# Patient Record
Sex: FEMALE | Race: WHITE
[De-identification: names, ages, dates, MRNs, and addresses within clinical notes are randomized per-mention and may not be internally consistent; named-entity substitution may affect disease eponyms.]

---

## 2017-05-11 NOTE — PCM.OPNOTE
- General Post-Op/Procedure Note


Date of Surgery/Procedure: 05/11/17


Operative Procedure(s): c scope with biopsy


Findings: 





cecum, ascending, transverse, descending, sigmoid colon, rectum 


Pre Op Diagnosis: hx of colon polyp


Post-Op Diagnosis: polyps:  cecum, ascending, transverse, descending, sigmoid 

colon, rectum


Anesthesia Technique: MAC


Primary Surgeon: Faustino Sawyer


Anesthesia Provider: Keegan Solares


Pathology: 








cecum, ascending, transverse, descending, sigmoid colon, rectum 


Complications: None


Condition: Good


Free Text/Narrative:: 





see dictation

## 2017-05-11 NOTE — OR
DATE OF OPERATION:  05/11/2017

 

SURGEON:  Faustino Sawyer MD

 

PROCEDURE PERFORMED:  Colonoscopy with cold forceps biopsy.

 

PREOPERATIVE DIAGNOSIS:  Personal history of colon polyps.

 

POSTOPERATIVE DIAGNOSIS:  Colon polyp of the cecum, ascending colon, transverse

colon, descending colon, sigmoid, and rectum.

 

INDICATIONS FOR PROCEDURE:  This is a 71-year-old white female, who presents for

a followup colonoscopy.  Last scope was 5 years ago.  She had adenomatous polyps

removed, presents now for a followup scope.

 

DESCRIPTION OF PROCEDURE:  After an excellent IV sedation was administered,

digital rectal exam was performed.  No marked abnormality was noted.  Flexible

colonoscope was inserted and advanced to the cecum without difficulty.  The

following findings were noted:  Ascending colon in the cecum; a small polyp,

biopsied and submitted in 1 container, just distal to that another polyp was

encountered, biopsied and submitted into 1 container.  Transverse colon; a small

polyp, biopsied and submitted in 1 container.  Descending colon; a small polyp,

biopsied and submitted in 1 container.  Sigmoid; small polyp, as well as some

diverticulosis biopsied and submitted in 1 container.  Rectum; a small polyp,

biopsied and submitted in 1 container.  Colon was deflated as the scope was

removed.  The patient tolerated the procedure well, was taken to recovery room

in good condition.

 

Job#: [id number]

DD: 05/11/2017 1051

DT: 05/11/2017 1817 AS/MODL

## 2018-05-22 ENCOUNTER — HOSPITAL ENCOUNTER (OUTPATIENT)
Dept: HOSPITAL 7 - FB.SDS | Age: 73
Discharge: HOME | End: 2018-05-22
Attending: SURGERY
Payer: MEDICARE

## 2018-05-22 VITALS — DIASTOLIC BLOOD PRESSURE: 75 MMHG | SYSTOLIC BLOOD PRESSURE: 108 MMHG

## 2018-05-22 DIAGNOSIS — Z87.891: ICD-10-CM

## 2018-05-22 DIAGNOSIS — Z88.8: ICD-10-CM

## 2018-05-22 DIAGNOSIS — Z79.51: ICD-10-CM

## 2018-05-22 DIAGNOSIS — J45.909: ICD-10-CM

## 2018-05-22 DIAGNOSIS — R19.7: Primary | ICD-10-CM

## 2018-05-22 DIAGNOSIS — Z86.010: ICD-10-CM

## 2018-05-22 DIAGNOSIS — Z88.7: ICD-10-CM

## 2018-05-22 DIAGNOSIS — Z79.899: ICD-10-CM

## 2018-05-22 NOTE — OR
DATE OF OPERATION:  05/22/2018

 

SURGEON:  Faustino Sawyer MD

 

PROCEDURES PERFORMED:  Colonoscopy with random biopsies.

 

PREOPERATIVE DIAGNOSIS:  Personal history of diarrhea.

 

POSTOPERATIVE DIAGNOSIS:  Normal scope.

 

INDICATIONS FOR PROCEDURE:  This is a 72-year-old white female who is referred

with a history of diarrhea.  She was offered and accepted colonoscopy.  She also

has a history of adenomatous polyps.  She was offered and accepted same.

 

DESCRIPTION OF PROCEDURE:  After an excellent IV sedation was administered,

digital rectal exam was performed.  No marked abnormality was noted.  Flexible

colonoscope was inserted and advanced to the cecum without difficulty.  The prep

was excellent.  The following findings were noted.  Ascending colon,

unremarkable.  Random biopsies were taken.  Transverse colon, unremarkable.

Random biopsies were taken.  Descending colon, unremarkable.  Random biopsies

were taken.  Sigmoid and rectum, unremarkable.  Random biopsies were taken.  The

colon was deflated as the scope was removed.  The patient tolerated the

procedure well and was taken to Recovery in a good condition.

 

Job#: [id number]

DD: 05/22/2018 1032

DT: 05/22/2018 1158 AS/AMBER

## 2018-05-22 NOTE — PCM.OPNOTE
- General Post-Op/Procedure Note


Date of Surgery/Procedure: 05/22/18


Operative Procedure(s): c scope with bx


Findings: 





normal exam 


Pre Op Diagnosis: diarrhea


Post-Op Diagnosis: Same


Anesthesia Technique: MAC


Primary Surgeon: Faustino Sawyer


Anesthesia Provider: Jasper Galvan


Pathology: 





random colon bx 


Complications: None


Condition: Good


Free Text/Narrative:: 





see dictation

## 2018-10-01 ENCOUNTER — HOSPITAL ENCOUNTER (EMERGENCY)
Dept: HOSPITAL 7 - FB.ED | Age: 73
Discharge: OTHER INSTITUTION | End: 2018-10-01
Payer: MEDICARE

## 2018-10-01 VITALS — SYSTOLIC BLOOD PRESSURE: 64 MMHG | DIASTOLIC BLOOD PRESSURE: 37 MMHG

## 2018-10-01 DIAGNOSIS — T45.1X5A: ICD-10-CM

## 2018-10-01 DIAGNOSIS — C25.9: ICD-10-CM

## 2018-10-01 DIAGNOSIS — Z88.8: ICD-10-CM

## 2018-10-01 DIAGNOSIS — D70.1: Primary | ICD-10-CM

## 2018-10-01 DIAGNOSIS — Z88.7: ICD-10-CM

## 2018-10-01 PROCEDURE — 84132 ASSAY OF SERUM POTASSIUM: CPT

## 2018-10-01 PROCEDURE — 36415 COLL VENOUS BLD VENIPUNCTURE: CPT

## 2018-10-01 PROCEDURE — 80048 BASIC METABOLIC PNL TOTAL CA: CPT

## 2018-10-01 PROCEDURE — 87077 CULTURE AEROBIC IDENTIFY: CPT

## 2018-10-01 PROCEDURE — 83735 ASSAY OF MAGNESIUM: CPT

## 2018-10-01 PROCEDURE — 99285 EMERGENCY DEPT VISIT HI MDM: CPT

## 2018-10-01 PROCEDURE — 85025 COMPLETE CBC W/AUTO DIFF WBC: CPT

## 2018-10-01 PROCEDURE — 96361 HYDRATE IV INFUSION ADD-ON: CPT

## 2018-10-01 PROCEDURE — 87040 BLOOD CULTURE FOR BACTERIA: CPT

## 2018-10-01 PROCEDURE — 96366 THER/PROPH/DIAG IV INF ADDON: CPT

## 2018-10-01 PROCEDURE — 96375 TX/PRO/DX INJ NEW DRUG ADDON: CPT

## 2018-10-01 PROCEDURE — 96365 THER/PROPH/DIAG IV INF INIT: CPT

## 2018-10-01 PROCEDURE — 80053 COMPREHEN METABOLIC PANEL: CPT

## 2018-10-01 RX ADMIN — POTASSIUM CHLORIDE SCH MLS/HR: 200 INJECTION, SOLUTION INTRAVENOUS at 14:05

## 2018-10-01 RX ADMIN — Medication PRN ML: at 09:35

## 2018-10-01 RX ADMIN — POTASSIUM CHLORIDE ONE: 1500 TABLET, EXTENDED RELEASE ORAL at 10:34

## 2018-10-01 RX ADMIN — Medication PRN ML: at 09:59

## 2018-10-01 RX ADMIN — POTASSIUM CHLORIDE ONE MEQ: 1500 TABLET, EXTENDED RELEASE ORAL at 10:31

## 2018-10-01 RX ADMIN — POTASSIUM CHLORIDE SCH MLS/HR: 200 INJECTION, SOLUTION INTRAVENOUS at 13:32

## 2018-10-01 NOTE — EDM.PDOC
ED HPI GENERAL MEDICAL PROBLEM





- General


Chief Complaint: Syncope


Stated Complaint: DEHYDRIATED


Time Seen by Provider: 10/01/18 09:30


Source of Information: Reports: Patient, Family


History Limitations: Reports: No Limitations





- History of Present Illness


INITIAL COMMENTS - FREE TEXT/NARRATIVE: 





Colleen comes to [facility] ED by EMS following a syncopal episode this am. She was 

diagnosed with CA Pancreas about 2 weeks ago, and finsihed first course of 

chemotherapy at that time. She developed escalating weakness, vomiting and 

diarrhea last week, and did go to the Clinic daily x 3 for IV hydration. She 

seemed better over the weekend, but relapsed last evening with weakness, nausea 

and vomiting. She lost consciousness this am for "a couple of seconds" while in 

the [place], and EMS was summoned. Upon arrival, /60, , alert and 

cooperative. She received 1L NS by EMS prior to arrival. She denies headache, 

chest pain, abdominal pain or SOB. She has a peripheral IV and a veniport in R 

upper chest. 





- Related Data


 Allergies











Allergy/AdvReac Type Severity Reaction Status Date / Time


 


loratadine [From Claritin] Allergy  Swelling Verified 10/01/18 09:30


 


tetanus toxoid, adsorbed Allergy  Swelling Verified 10/01/18 09:30











Home Meds: 


 Home Meds





Albuterol [Ventolin HFA] 2 puff IH Q4H PRN 05/10/17 [History]


Budesonide/Formoterol Fumarate [Symbicort 80-4.5 Mcg Inhaler] 1 puff IH BID 05/

10/17 [History]


Calcium Citrate/Vitamin D3 [Calcium Citrate - Vit D Caplet] 1 ea PO BID 05/10/

17 [History]


Cetirizine [ZyrTEC] 10 mg PO DAILY PRN 05/10/17 [History]


Ibuprofen 200 mg PO ASDIRECTED PRN 05/10/17 [History]


Raloxifene [Evista] 60 mg PO DAILY 05/10/17 [History]


diphenhydrAMINE [Benadryl] 25 mg PO ASDIRECTED PRN 05/10/17 [History]


Carboxymethylcellulose Sodium [Refresh Plus 0.5% Ophth Soln] 1 each OP Q2HR PRN 

05/21/18 [History]


Psyllium Husk (With Sugar) [Metamucil Powder] 575 gm PO DAILY 05/21/18 [History]


Dexamethasone 4 mg PO DAILY PRN 10/01/18 [History]


Loperamide HCl [Loperamide] 4 mg PO ASDIRECTED PRN MDD 8 10/01/18 [History]


Ondansetron [Ondansetron ODT] 4 mg SL Q4H PRN 10/01/18 [History]


Prochlorperazine [Compazine] 10 mg PO Q6H 10/01/18 [History]


hydrOXYzine HCl [hydrOXYzine] 10 mg PO DAILY 10/01/18 [History]


oxyCODONE 5 mg PO Q6H PRN 10/01/18 [History]











Past Medical History


HEENT History: Reports: Cataract, Impaired Vision, Other (See Below)


Other HEENT History: EXC RIGHT UPPER LID LESION, PSEUDOPHAKIA, POSTERIOR 

VITREOUS DETACHMENT


Cardiovascular History: Reports: None


Respiratory History: Reports: Asthma


Gastrointestinal History: Reports: Colon Polyp


Genitourinary History: Reports: None


OB/GYN History: Reports: Pregnancy


Musculoskeletal History: Reports: Arthritis, Other (See Below)


Other Musculoskeletal History: PAGET'S DISEASE


Neurological History: Reports: None


Psychiatric History: Reports: None


Endocrine/Metabolic History: Reports: None


Hematologic History: Reports: None


Immunologic History: Reports: None


Oncologic (Cancer) History: Reports: Breast, Pancreatic


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


Dermatologic History: Reports: None





- Infectious Disease History


Infectious Disease History: Reports: Chicken Pox, Measles, Mumps





- Past Surgical History


Head Surgeries/Procedures: Reports: None


HEENT Surgical History: Reports: Cataract Surgery


Cardiovascular Surgical History: Reports: None


Respiratory Surgical History: Reports: None


GI Surgical History: Reports: Colonoscopy, EGD


Female  Surgical History: Reports: Other (See Below)


Other Female  Surgeries/Procedures: LEFT BREAST LUMPECTOMY


Endocrine Surgical History: Reports: None


Neurological Surgical History: Reports: None


Musculoskeletal Surgical History: Reports: None


Dermatological Surgical History: Reports: None





Social & Family History





- Family History


HEENT: 


OBGYN: Reports: Other (See Below)


Other OBGYN Family History: FAMILY HX OVARIAN CA





- Caffeine Use


Caffeine Use: Reports: Coffee, Tea





ED ROS GENERAL





- Review of Systems


Review Of Systems: See Below


Constitutional: Reports: Malaise, Weakness, Fatigue, Decreased Appetite, Weight 

Loss


HEENT: Reports: No Symptoms


Respiratory: Reports: No Symptoms


Cardiovascular: Reports: Blood Pressure Problem, Lightheadedness


GI/Abdominal: Reports: Diarrhea, Decreased Appetite, Nausea, Vomiting


: Reports: No Symptoms


Musculoskeletal: Reports: No Symptoms


Skin: Reports: No Symptoms


Neurological: Reports: Dizziness, Syncope


Psychiatric: Reports: No Symptoms


Hematologic/Lymphatic: Reports: No Symptoms


Immunologic: Reports: No Symptoms





- Physical Exam


Exam: See Below


Exam Limited By: No Limitations


General Appearance: Alert, WD/WN, No Apparent Distress, Thin


Eye Exam: Bilateral Eye: EOMI, Normal Inspection, PERRL


Ears: Normal External Exam


Nose: Normal Inspection


Throat/Mouth: Normal Inspection, Normal Gums, Normal Oropharynx, Normal Voice, 

No Airway Compromise


Head Exam: Atraumatic, Normocephalic


Neck: Normal Inspection, Supple, Non-Tender


Respiratory/Chest: No Respiratory Distress, Lungs Clear, Normal Breath Sounds, 

No Accessory Muscle Use, Chest Non-Tender


Cardiovascular: Normal Peripheral Pulses, No Edema, No Gallop, No JVD, No Murmur

, No Rub, Tachycardia


GI/Abdominal: Normal Bowel Sounds, Soft, Non-Tender, No Organomegaly, No 

Distention, No Mass


 (Female) Exam: Deferred


Rectal (Female) Exam: Deferred


Neuro Exam (Abbreviated): Alert, Oriented, CN II-XII Intact, No Motor/Sensory 

Deficits


Back Exam: Normal Inspection


Extremities: Normal Inspection


Psychiatric: Normal Mood, Flat Affect


Skin Exam: Warm, Dry, Intact, No Rash





Course





- Vital Signs


Text/Narrative:: 





Following assessment at the [facility] ED, IV fluids with D5LR was continued pending 

results of screening labs: CBC noted Hgb 11.3 gm, WBC 1,200 with 84% PMNs, plts 

117,000; Na 138, K 1.6, BUN 17, Cr 1.3, Ca 6.6; the IV was changed to NS and 

KCl 40 meq piggybacked at 20 meq/hr thru central line: repeat K 1.7 meq/l 2 

hours later; BPs 74/47 were detected and a 500 ml NS bolus was administered, 

followed by a KCL 40 meq infusion thru central line over 2 hours was repeated. 

Case was discussed with hospitalist who suggested transfer to [facility], and case was discussed with Dr Moreland who accepted patient. In 

view of persistent hypotension, BC pending, I empirically administered Zosyn 

3.375 mg IV for neutropenic fever w hypotension. 


Last Recorded V/S: 


 Last Vital Signs











Temp  38.6 C H  10/01/18 14:00


 


Pulse  108 H  10/01/18 12:00


 


Resp  20   10/01/18 14:45


 


BP  69/37 L  10/01/18 14:45


 


Pulse Ox  97   10/01/18 14:45














- Orders/Labs/Meds


Orders: 


 Active Orders 24 hr











 Category Date Time Status


 


 Central Line Assessment [RC] ASDIRECTED Care  10/01/18 09:40 Active


 


 CBC WITH AUTO DIFF [HEME] Stat Lab  10/01/18 15:00 Results


 


 COMPREHENSIVE METABOLIC PN,CMP [CHEM] Stat Lab  10/01/18 15:00 Received


 


 CULTURE BLOOD [BC] Stat Lab  10/01/18 14:45 Received


 


 Piperacillin/Tazobactam [Zosyn] 3.375 gm Med  10/01/18 15:30 Ordered





 Sodium Chloride 0.9% [Normal Saline] 50 ml   





 IV Q6H   


 


 Potassium Chloride [KCL 20 MEQ in Water 100 ML] 20 meq Med  10/01/18 14:00 

Active





 Premix Bag 1 bag   





 IV ONETIME   


 


 Sodium Chloride 0.9% [Normal Saline] 1,000 ml Med  10/01/18 10:45 Active





 IV ASDIRECTED   


 


 Sodium Chloride 0.9% [Normal Saline] 1,000 ml Med  10/01/18 13:20 Active





 IV ASDIRECTED   


 


 Sodium Chloride 0.9% [Saline Flush] Med  10/01/18 09:40 Active





 10 ml FLUSH ASDIRECTED PRN   








 Medication Orders





Sodium Chloride (Normal Saline)  1,000 mls @ 500 mls/hr IV ASDIRECTED NISSA


   Last Admin: 10/01/18 10:50  Dose: 500 mls/hr


Potassium Chloride 20 meq/ (Premix)  100 mls @ 100 mls/hr IV ONETIME NISSA


   Stop: 10/02/18 14:59


   Last Admin: 10/01/18 14:05  Dose: 100 mls/hr


Sodium Chloride (Normal Saline)  1,000 mls @ 500 mls/hr IV ASDIRECTED NISSA


   Last Admin: 10/01/18 15:09  Dose: 400 mls/hr


   Infusion: 10/01/18 15:02  Dose: 400 mls/hr


   Infusion: 10/01/18 14:05  Dose: 400 mls/hr


   Infusion: 10/01/18 13:35  Dose: 999 mls/hr


   Admin: 10/01/18 13:20  Dose: 500 mls/hr


Piperacillin Sod/Tazobactam (Sod 3.375 gm/ Sodium Chloride)  50 mls @ 100 mls/

hr IV Q6H NISSA


Sodium Chloride (Saline Flush)  10 ml FLUSH ASDIRECTED PRN


   PRN Reason: Keep Vein Open


   Last Admin: 10/01/18 09:59  Dose: 10 ml


   Admin: 10/01/18 09:35  Dose: 10 ml








Labs: 


 Laboratory Tests











  10/01/18 10/01/18 10/01/18 Range/Units





  09:55 09:55 13:10 


 


WBC  1.2 L*    (4.5-12.0)  X10-3/uL


 


RBC  3.61    (3.23-5.20)  x10(6)uL


 


Hgb  11.3 L    (11.5-15.5)  g/dL


 


Hct  33.1    (30.0-51.3)  %


 


MCV  91.8    (80-96)  fL


 


MCH  31.5    (27.7-33.6)  pg


 


MCHC  34.3    (32.2-35.4)  g/dL


 


RDW  13.0    (11.5-15.5)  %


 


Plt Count  117 L    (125-369)  X10(3)uL


 


MPV  6.9 L    (7.4-10.4)  fL


 


Add Manual Diff  Yes    


 


Neutrophils % (Manual)  84 H    (46-82)  %


 


Lymphocytes % (Manual)  16    (13-37)  %


 


Sodium   138   (135-145)  mmol/L


 


Potassium   1.6 L*  1.7 L*  (3.5-5.3)  mmol/L


 


Chloride   105   (100-110)  mmol/L


 


Carbon Dioxide   20 L   (21-32)  mmol/L


 


BUN   17   (7-18)  mg/dL


 


Creatinine   1.3 H   (0.55-1.02)  mg/dL


 


Est Cr Clr Drug Dosing   TNP   


 


Estimated GFR (MDRD)   40 L   (>60)  


 


BUN/Creatinine Ratio   13.1   (9-20)  


 


Glucose   196 H   ()  mg/dL


 


Calcium   6.6 L   (8.6-10.2)  mg/dL


 


Magnesium     (1.8-2.5)  mg/dL














  10/01/18 10/01/18 Range/Units





  13:10 15:00 


 


WBC   2.1 L  (4.5-12.0)  X10-3/uL


 


RBC   3.10 L  (3.23-5.20)  x10(6)uL


 


Hgb   9.8 L  (11.5-15.5)  g/dL


 


Hct   28.3 L  (30.0-51.3)  %


 


MCV   91.1  (80-96)  fL


 


MCH   31.7  (27.7-33.6)  pg


 


MCHC   34.8  (32.2-35.4)  g/dL


 


RDW   12.8  (11.5-15.5)  %


 


Plt Count   85 L  (125-369)  X10(3)uL


 


MPV   7.2 L  (7.4-10.4)  fL


 


Add Manual Diff   Yes  


 


Neutrophils % (Manual)    (46-82)  %


 


Lymphocytes % (Manual)    (13-37)  %


 


Sodium    (135-145)  mmol/L


 


Potassium    (3.5-5.3)  mmol/L


 


Chloride    (100-110)  mmol/L


 


Carbon Dioxide    (21-32)  mmol/L


 


BUN    (7-18)  mg/dL


 


Creatinine    (0.55-1.02)  mg/dL


 


Est Cr Clr Drug Dosing    


 


Estimated GFR (MDRD)    (>60)  


 


BUN/Creatinine Ratio    (9-20)  


 


Glucose    ()  mg/dL


 


Calcium    (8.6-10.2)  mg/dL


 


Magnesium  1.3 L   (1.8-2.5)  mg/dL











Meds: 


Medications











Generic Name Dose Route Start Last Admin





  Trade Name Freq  PRN Reason Stop Dose Admin


 


Sodium Chloride  1,000 mls @ 500 mls/hr  10/01/18 10:45  10/01/18 10:50





  Normal Saline  IV   500 mls/hr





  ASDIRECTED NISSA   Administration





     





     





     





     


 


Potassium Chloride 20 meq/  100 mls @ 100 mls/hr  10/01/18 14:00  10/01/18 14:05





  Premix  IV  10/02/18 14:59  100 mls/hr





  ONETIME NISSA   Administration





     





     





     





     


 


Sodium Chloride  1,000 mls @ 500 mls/hr  10/01/18 13:20  10/01/18 15:09





  Normal Saline  IV   400 mls/hr





  ASDIRECTED NISSA   Administration





     





     





     





     


 


Piperacillin Sod/Tazobactam  50 mls @ 100 mls/hr  10/01/18 15:30  





  Sod 3.375 gm/ Sodium Chloride  IV   





  Q6H NISSA   





     





     





     





     


 


Sodium Chloride  10 ml  10/01/18 09:40  10/01/18 09:59





  Saline Flush  FLUSH   10 ml





  ASDIRECTED PRN   Administration





  Keep Vein Open   





     





     





     














Discontinued Medications














Generic Name Dose Route Start Last Admin





  Trade Name George  PRN Reason Stop Dose Admin


 


Dextrose/Lactated Ringer's  1,000 mls @ 500 mls/hr  10/01/18 09:30  10/01/18 09:

39





  Dextrose 5%-Lactated Ringers  IV   500 mls/hr





  ASDIRECTED NISSA   Administration





     





     





     





     


 


Potassium Chloride  100 mls @ 50 mls/hr  10/01/18 11:00  





  Kcl 20 Meq In Water 100 Ml  IV  10/01/18 14:59  





  Q2H NISSA   





     





     





     





     


 


Potassium Chloride  100 mls @ 100 mls/hr  10/01/18 11:00  10/01/18 12:03





  Kcl 20 Meq In Water 100 Ml  IV  10/01/18 12:59  100 mls/hr





  Q1H NISSA   Administration





     





     





     





     


 


Potassium Chloride  40 meq  10/01/18 10:23  10/01/18 10:34





  Klor-Con M20  PO  10/01/18 10:24  Not Given





  ONETIME ONE   





     





     





     





     














Departure





- Departure


Time of Disposition: 14:51


Disposition: DC/Tfer to Other 70


Condition: Poor


Clinical Impression: 


 Chemotherapy induced neutropenia





Cancer of pancreas


Qualifiers:


 Pancreatic malignancy location: unspecified Qualified Code(s): C25.9 - 

Malignant neoplasm of pancreas, unspecified








- Discharge Information


*PRESCRIPTION DRUG MONITORING PROGRAM REVIEWED*: Not Applicable


*COPY OF PRESCRIPTION DRUG MONITORING REPORT IN PATIENT HANDY: Not Applicable


Referrals: 


Kathia Gracia PA [Primary Care Provider] - 


Forms:  ED Department Discharge





- Problem List & Annotations


(1) Cancer of pancreas


SNOMED Code(s): 723319621


   Code(s): C25.9 - MALIGNANT NEOPLASM OF PANCREAS, UNSPECIFIED   Status: Acute

   Current Visit: Yes   Annotation/Comment:: Transfer to [facility] for 

further managment.   


Qualifiers: 


   Pancreatic malignancy location: unspecified   Qualified Code(s): C25.9 - 

Malignant neoplasm of pancreas, unspecified   





(2) Chemotherapy induced neutropenia


SNOMED Code(s): 648856172


   Code(s): D70.1 - AGRANULOCYTOSIS SECONDARY TO CANCER CHEMOTHERAPY; T45.1X5A 

- ADVERSE EFFECT OF ANTINEOPLASTIC AND IMMUNOSUP DRUGS, INIT   Status: Acute   

Current Visit: Yes   Annotation/Comment:: Transfer to [facility] for further 

managment.   





- Problem List Review


Problem List Initiated/Reviewed/Updated: Yes





- My Orders


Last 24 Hours: 


My Active Orders





10/01/18 09:40


Central Line Assessment [RC] ASDIRECTED 


Sodium Chloride 0.9% [Saline Flush]   10 ml FLUSH ASDIRECTED PRN 





10/01/18 10:45


Sodium Chloride 0.9% [Normal Saline] 1,000 ml IV ASDIRECTED 





10/01/18 13:20


Sodium Chloride 0.9% [Normal Saline] 1,000 ml IV ASDIRECTED 





10/01/18 14:00


Potassium Chloride [KCL 20 MEQ in Water 100 ML] 20 meq   Premix Bag 1 bag IV 

ONETIME 





10/01/18 14:45


CULTURE BLOOD [BC] Stat 





10/01/18 15:00


CBC WITH AUTO DIFF [HEME] Stat 


COMPREHENSIVE METABOLIC PN,CMP [CHEM] Stat 





10/01/18 15:30


Piperacillin/Tazobactam [Zosyn] 3.375 gm   Sodium Chloride 0.9% [Normal Saline] 

50 ml IV Q6H 














- Assessment/Plan


Last 24 Hours: 


My Active Orders





10/01/18 09:40


Central Line Assessment [RC] ASDIRECTED 


Sodium Chloride 0.9% [Saline Flush]   10 ml FLUSH ASDIRECTED PRN 





10/01/18 10:45


Sodium Chloride 0.9% [Normal Saline] 1,000 ml IV ASDIRECTED 





10/01/18 13:20


Sodium Chloride 0.9% [Normal Saline] 1,000 ml IV ASDIRECTED 





10/01/18 14:00


Potassium Chloride [KCL 20 MEQ in Water 100 ML] 20 meq   Premix Bag 1 bag IV 

ONETIME 





10/01/18 14:45


CULTURE BLOOD [BC] Stat 





10/01/18 15:00


CBC WITH AUTO DIFF [HEME] Stat 


COMPREHENSIVE METABOLIC PN,CMP [CHEM] Stat 





10/01/18 15:30


Piperacillin/Tazobactam [Zosyn] 3.375 gm   Sodium Chloride 0.9% [Normal Saline] 

50 ml IV Q6H 











Plan: 





Follow up with medical oncology at [facility].